# Patient Record
(demographics unavailable — no encounter records)

---

## 2024-10-11 NOTE — HISTORY OF PRESENT ILLNESS
[FreeTextEntry1] : Patient presents today with a lesion, sub. 5th metatarsal, left foot.  Patient has difficulty ambulating due to pain and discomfort.  She is antinuclear antibody positive and has fibromyalgia.  No other changes to her medical status.

## 2024-10-11 NOTE — PHYSICAL EXAM
[General Appearance - Alert] : alert [Ankle Swelling (On Exam)] : not present [Varicose Veins Of Lower Extremities] : not present [Delayed in the Right Toes] : capillary refills normal in right toes [Delayed in the Left Toes] : capillary refills normal in the left toes [2+] : left foot dorsalis pedis 2+ [FreeTextEntry3] : Temperature gradient: warm to cool.  [No Joint Swelling] : no joint swelling [] : normal strength/tone [Normal Foot/Ankle] : Both lower extremities were exposed and visualized. Standing exam demonstrates normal foot posture and alignment. Hindfoot exam shows no hindfoot valgus or varus [FreeTextEntry1] : Porokeratoma, plantar aspect, sub. 5 base, left foot.   [Sensation] : the sensory exam was normal to light touch and pinprick [No Focal Deficits] : no focal deficits [Deep Tendon Reflexes (DTR)] : deep tendon reflexes were 2+ and symmetric [Motor Exam] : the motor exam was normal [Oriented To Time, Place, And Person] : oriented to person, place, and time

## 2024-10-11 NOTE — ASSESSMENT
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  Fibromyalgia (M79.7).  ZEUS positive (R76.8).  Treatment: The lesion was debrided and enucleated to relieve her pain.  Patient was given home care instructions.  Any questions or problems, patient is to contact the office.

## 2024-10-29 NOTE — HISTORY OF PRESENT ILLNESS
[___ Month(s) Ago] : [unfilled] month(s) ago [FreeTextEntry1] : =reporting dx of OP =no fractures; dental implants 6 months ago =no fevers, SOB, CP, abdominal pain, n/v/d, rashes, joint pain, swelling

## 2024-10-29 NOTE — DATA REVIEWED
[FreeTextEntry1] : Labs reviewed from 2022 ZEUS 1:160 speckled  RF, CCP, DsDNA, ADA, Sjogren, ESR, CRP negative  Xrays reviewed from 2022 shoulders: wnl  hands: wnl  knees: mild OA  feet/ankles: wnl   DEXA scan reviewed from 8/24 T score -2.5

## 2024-10-29 NOTE — PHYSICAL EXAM
[General Appearance - Well Nourished] : well nourished [General Appearance - Well Developed] : well developed [Sclera] : the sclera and conjunctiva were normal [Auscultation Breath Sounds / Voice Sounds] : lungs were clear to auscultation bilaterally [Heart Sounds] : normal S1 and S2 [Heart Sounds Gallop] : no gallops [Murmurs] : no murmurs [Heart Sounds Pericardial Friction Rub] : no pericardial rub [Abdomen Tenderness] : non-tender [] : no rash [Skin Lesions] : no skin lesions [Oriented To Time, Place, And Person] : oriented to person, place, and time [Musculoskeletal - Swelling] : no joint swelling seen

## 2024-10-29 NOTE — ASSESSMENT
[FreeTextEntry1] : 62 y/o F w OP, FM  =DEXA 8/24 T score -2.5 =no hx of fractures ***************************************************************************************** =polyarthralgais =stiffness, no swelling =labs 2022 ZEUS 1:160 speckled, RF, CCP, DsDNA, ADA, Sjogren, ESR, CRP negative =Xrays 2022 shoulders: wnl  hands: wnl  knees: mild OA  feet/ankles: wnl  =pt doing PT   Discussed OP, and left untreated, can have fragility fx that leads to morbidity and mortality. Recommend vitamin D 800 IU daily. Pt should do high impact exercise to improve OP.   Explained side effects of biphosphonte including esophagitis, atypical fracture, avascular necrosis, hypocalcemia. Explained patient must drink a full glass of water when taking medication and sit up for at least half an hour.  Advised if pt doing dental extractions, or dental implants, to notify me and we will have to pause temporarily the medication.   Plan Labs w calcium, vitamin D alendronate 70mg weekly  vitamin D 1000 IU daily for now RTO in 6 months